# Patient Record
Sex: MALE | Race: WHITE | Employment: UNEMPLOYED | URBAN - METROPOLITAN AREA
[De-identification: names, ages, dates, MRNs, and addresses within clinical notes are randomized per-mention and may not be internally consistent; named-entity substitution may affect disease eponyms.]

---

## 2019-10-28 ENCOUNTER — OFFICE VISIT (OUTPATIENT)
Dept: ORTHOPEDIC SURGERY | Age: 39
End: 2019-10-28
Payer: COMMERCIAL

## 2019-10-28 VITALS
HEIGHT: 78 IN | SYSTOLIC BLOOD PRESSURE: 131 MMHG | BODY MASS INDEX: 34.83 KG/M2 | DIASTOLIC BLOOD PRESSURE: 89 MMHG | HEART RATE: 78 BPM | WEIGHT: 301 LBS

## 2019-10-28 DIAGNOSIS — M77.11 LATERAL EPICONDYLITIS OF RIGHT ELBOW: Primary | ICD-10-CM

## 2019-10-28 PROCEDURE — 99203 OFFICE O/P NEW LOW 30 MIN: CPT | Performed by: ORTHOPAEDIC SURGERY

## 2019-10-28 PROCEDURE — 20605 DRAIN/INJ JOINT/BURSA W/O US: CPT | Performed by: ORTHOPAEDIC SURGERY

## 2019-10-28 SDOH — HEALTH STABILITY: MENTAL HEALTH: HOW OFTEN DO YOU HAVE A DRINK CONTAINING ALCOHOL?: NEVER

## 2019-10-28 ASSESSMENT — ENCOUNTER SYMPTOMS
APNEA: 0
COUGH: 0
CHEST TIGHTNESS: 0
NAUSEA: 0
DIARRHEA: 0
ABDOMINAL DISTENTION: 0
COLOR CHANGE: 0
CONSTIPATION: 0
VOMITING: 0
ABDOMINAL PAIN: 0
SHORTNESS OF BREATH: 0

## 2019-10-29 RX ORDER — METHYLPREDNISOLONE ACETATE 40 MG/ML
40 INJECTION, SUSPENSION INTRA-ARTICULAR; INTRALESIONAL; INTRAMUSCULAR; SOFT TISSUE ONCE
Status: COMPLETED | OUTPATIENT
Start: 2019-10-29 | End: 2019-10-30

## 2019-10-29 RX ORDER — LIDOCAINE HYDROCHLORIDE 10 MG/ML
4 INJECTION, SOLUTION INFILTRATION; PERINEURAL ONCE
Status: COMPLETED | OUTPATIENT
Start: 2019-10-29 | End: 2019-10-30

## 2019-10-30 RX ADMIN — LIDOCAINE HYDROCHLORIDE 4 ML: 10 INJECTION, SOLUTION INFILTRATION; PERINEURAL at 07:49

## 2019-10-30 RX ADMIN — METHYLPREDNISOLONE ACETATE 40 MG: 40 INJECTION, SUSPENSION INTRA-ARTICULAR; INTRALESIONAL; INTRAMUSCULAR; SOFT TISSUE at 07:50

## 2019-11-25 ENCOUNTER — OFFICE VISIT (OUTPATIENT)
Dept: ORTHOPEDIC SURGERY | Age: 39
End: 2019-11-25
Payer: COMMERCIAL

## 2019-11-25 VITALS
DIASTOLIC BLOOD PRESSURE: 88 MMHG | BODY MASS INDEX: 34.71 KG/M2 | WEIGHT: 300 LBS | SYSTOLIC BLOOD PRESSURE: 138 MMHG | HEIGHT: 78 IN | HEART RATE: 87 BPM

## 2019-11-25 DIAGNOSIS — M77.11 LATERAL EPICONDYLITIS OF RIGHT ELBOW: Primary | ICD-10-CM

## 2019-11-25 PROCEDURE — 99212 OFFICE O/P EST SF 10 MIN: CPT | Performed by: ORTHOPAEDIC SURGERY

## 2019-11-25 RX ORDER — DULOXETIN HYDROCHLORIDE 60 MG/1
CAPSULE, DELAYED RELEASE ORAL
Refills: 0 | COMMUNITY
Start: 2019-11-19

## 2019-11-25 ASSESSMENT — ENCOUNTER SYMPTOMS
COUGH: 0
ABDOMINAL PAIN: 0
APNEA: 0
ABDOMINAL DISTENTION: 0
NAUSEA: 0
CONSTIPATION: 0
SHORTNESS OF BREATH: 0
CHEST TIGHTNESS: 0
VOMITING: 0
DIARRHEA: 0
COLOR CHANGE: 0

## 2020-03-09 ENCOUNTER — OFFICE VISIT (OUTPATIENT)
Dept: ORTHOPEDIC SURGERY | Age: 40
End: 2020-03-09
Payer: COMMERCIAL

## 2020-03-09 VITALS
DIASTOLIC BLOOD PRESSURE: 84 MMHG | HEIGHT: 78 IN | BODY MASS INDEX: 34.71 KG/M2 | SYSTOLIC BLOOD PRESSURE: 125 MMHG | WEIGHT: 300 LBS | HEART RATE: 87 BPM

## 2020-03-09 PROCEDURE — 99212 OFFICE O/P EST SF 10 MIN: CPT | Performed by: ORTHOPAEDIC SURGERY

## 2020-03-09 PROCEDURE — 20605 DRAIN/INJ JOINT/BURSA W/O US: CPT | Performed by: ORTHOPAEDIC SURGERY

## 2020-03-09 RX ADMIN — METHYLPREDNISOLONE ACETATE 40 MG: 40 INJECTION, SUSPENSION INTRA-ARTICULAR; INTRALESIONAL; INTRAMUSCULAR; SOFT TISSUE at 11:20

## 2020-03-09 RX ADMIN — LIDOCAINE HYDROCHLORIDE 4 ML: 10 INJECTION, SOLUTION INFILTRATION; PERINEURAL at 11:20

## 2020-03-09 ASSESSMENT — ENCOUNTER SYMPTOMS
DIARRHEA: 0
ABDOMINAL PAIN: 0
NAUSEA: 0
APNEA: 0
VOMITING: 0
COLOR CHANGE: 0
ABDOMINAL DISTENTION: 0
COUGH: 0
CONSTIPATION: 0
CHEST TIGHTNESS: 0
SHORTNESS OF BREATH: 0

## 2020-03-09 NOTE — PROGRESS NOTES
[facility]  [address] [place] [id number]  Dept: [phone]  Dept Fax: [phone]        Right Elbow Follow Up      Subjective:     Chief Complaint   Patient presents with    Elbow Pain     Patient is here today to follow-up on his lateral epicondylitis of the right elbow, and would like an injection. HPI:     Kel Payton is a 42y.o. year old right hand dominant male that has had pain in the right elbow for 4.5 months since August. 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 As far as any trauma to the elbow, the patient indicates there was no trauma. The pain is not worse at night but it is worse when doing activities that involve lifting with the elbow. The pain restricts activities such as: heavy lifting and typing without pain. The pain does not seem to improve with time. The following medications have been tried: Motrin. He has also tried heat, rest and ice. The patient has had a corticosteroid injection on 10/28/2019 into the elbow with 100% pain relief for 4.5 months. The patient has not tried physical therapy. The patient has not has surgery. The opposite elbow is okay. The patient states that the cortisone injection took all of his pain away, but in the last 3-4 weeks the pain has returned to how it felt before the injection. ROS:   Review of Systems   Constitutional: Positive for activity change. Negative for appetite change, fatigue and fever. Respiratory: Negative for apnea, cough, chest tightness and shortness of breath. Cardiovascular: Negative for chest pain, palpitations and leg swelling. Gastrointestinal: Negative for abdominal distention, abdominal pain, constipation, diarrhea, nausea and vomiting. Genitourinary: Negative for difficulty urinating, dysuria and hematuria. Musculoskeletal: Positive for arthralgias. Negative for gait problem, joint swelling and myalgias.    Skin: Negative for color experience a increase in soreness the first 24-48 hours due to a cortisone flair and can take anti-inflammatories for a short period of time to reduce that soreness. The patient should not submerge the injection site in water for a minimum of 24 hours to avoid infection. This means no lakes, pools, ponds, or hot tubs for 24 hours. If the patient is diabetic the injection may increase their blood sugar for up to one week. The patient can do this cortisone injection once every 3 months as needed. If the injections stop working and do not give the patient relief the patient should consider surgical interventions to produce long term relief. A physical therapy prescription was not given. Patient should return to the clinic PRN to follow up with Phil Salinas D.O. . The patient will call the office immediately with any problems. Orders Placed This Encounter   Medications    lidocaine 1 % injection 4 mL    methylPREDNISolone acetate (DEPO-MEDROL) injection 40 mg       Orders Placed This Encounter   Procedures    XR ELBOW RIGHT (MIN 3 VIEWS)     Standing Status:   Future     Number of Occurrences:   1     Standing Expiration Date:   3/9/2021     Order Specific Question:   Reason for exam:     Answer:   pain       I, Shaheen Alvarez MS, AT, ATC, am scribing for and in the presence of Phil Salinas D.O.. 3/10/2020  3:34 PM            Electronically signed by Jai Farias DO, on 3/10/2020 at 3:34 PM             I, Phil Salinas DO, have personally seen this patient and I have reviewed the CC, PMH, FHX and Social History as provided by other clinical staff. I reassessed the HPI and ROS as scribed by Shaheen Alvarez ATC in my presence and it is both accurate and complete. Thereafter, I personally performed the PE, reviewed the imaging and established the DX and POC. I agree with the documentation provided by the .  I have reviewed all documentation in its entirety prior to providing my signature indicating

## 2020-03-10 RX ORDER — LIDOCAINE HYDROCHLORIDE 10 MG/ML
4 INJECTION, SOLUTION INFILTRATION; PERINEURAL ONCE
Status: COMPLETED | OUTPATIENT
Start: 2020-03-10 | End: 2020-03-09

## 2020-03-10 RX ORDER — METHYLPREDNISOLONE ACETATE 40 MG/ML
40 INJECTION, SUSPENSION INTRA-ARTICULAR; INTRALESIONAL; INTRAMUSCULAR; SOFT TISSUE ONCE
Status: COMPLETED | OUTPATIENT
Start: 2020-03-10 | End: 2020-03-09

## 2020-11-16 ENCOUNTER — OFFICE VISIT (OUTPATIENT)
Dept: ORTHOPEDIC SURGERY | Age: 40
End: 2020-11-16
Payer: COMMERCIAL

## 2020-11-16 VITALS
SYSTOLIC BLOOD PRESSURE: 138 MMHG | HEART RATE: 88 BPM | WEIGHT: 255 LBS | BODY MASS INDEX: 29.5 KG/M2 | TEMPERATURE: 97 F | HEIGHT: 78 IN | DIASTOLIC BLOOD PRESSURE: 79 MMHG

## 2020-11-16 PROCEDURE — 20551 NJX 1 TENDON ORIGIN/INSJ: CPT | Performed by: ORTHOPAEDIC SURGERY

## 2020-11-16 PROCEDURE — 99213 OFFICE O/P EST LOW 20 MIN: CPT | Performed by: ORTHOPAEDIC SURGERY

## 2020-11-16 RX ORDER — METHYLPREDNISOLONE ACETATE 40 MG/ML
40 INJECTION, SUSPENSION INTRA-ARTICULAR; INTRALESIONAL; INTRAMUSCULAR; SOFT TISSUE ONCE
Status: COMPLETED | OUTPATIENT
Start: 2020-11-16 | End: 2020-11-16

## 2020-11-16 RX ORDER — CELECOXIB 50 MG/1
50 CAPSULE ORAL 2 TIMES DAILY
COMMUNITY
Start: 2020-10-13

## 2020-11-16 RX ORDER — LIDOCAINE HYDROCHLORIDE 10 MG/ML
4 INJECTION, SOLUTION INFILTRATION; PERINEURAL ONCE
Status: COMPLETED | OUTPATIENT
Start: 2020-11-16 | End: 2020-11-16

## 2020-11-16 RX ORDER — GABAPENTIN 100 MG/1
100 CAPSULE ORAL 3 TIMES DAILY
COMMUNITY
Start: 2020-11-05

## 2020-11-16 RX ADMIN — METHYLPREDNISOLONE ACETATE 40 MG: 40 INJECTION, SUSPENSION INTRA-ARTICULAR; INTRALESIONAL; INTRAMUSCULAR; SOFT TISSUE at 14:17

## 2020-11-16 RX ADMIN — LIDOCAINE HYDROCHLORIDE 4 ML: 10 INJECTION, SOLUTION INFILTRATION; PERINEURAL at 14:16

## 2020-11-16 ASSESSMENT — ENCOUNTER SYMPTOMS
CHEST TIGHTNESS: 0
ABDOMINAL DISTENTION: 0
ABDOMINAL PAIN: 0
APNEA: 0
CONSTIPATION: 0
VOMITING: 0
DIARRHEA: 0
COUGH: 0
SHORTNESS OF BREATH: 0
COLOR CHANGE: 0
NAUSEA: 0

## 2020-11-16 NOTE — PATIENT INSTRUCTIONS
CORTISONE INJECTION CARE    The injection site should never get red, hot, or swollen and if it does the patient will contact our office right away. The patient may experience a increase in soreness the first 24-48 hours due to a cortisone flair and can take anti-inflammatories for a short period of time to reduce that soreness. The patient should not submerge the injection site in water for a minimum of 24 hours to avoid infection. This means no lakes, pools, ponds, or hot tubs for 24 hours. If the patient is diabetic the injection may increase their blood sugar for up to one week. The patient can do this cortisone injection once every 3 months as needed. CORTISONE INJECTION CARE    The injection site should never get red, hot, or swollen and if it does the patient will contact our office right away. The patient may experience a increase in soreness the first 24-48 hours due to a cortisone flair and can take anti-inflammatories for a short period of time to reduce that soreness. The patient should not submerge the injection site in water for a minimum of 24 hours to avoid infection. This means no lakes, pools, ponds, or hot tubs for 24 hours. If the patient is diabetic the injection may increase their blood sugar for up to one week. The patient can do this cortisone injection once every 3 months as needed.

## 2020-11-16 NOTE — PROGRESS NOTES
[facility]  [address]  [facility] [address]  Dept: [phone]  Dept Fax: [phone]        Right Elbow Follow Up      Subjective:     Chief Complaint   Patient presents with    Elbow Pain     Right elbow lateral epicondylitis, solomon- 3/9/20     HPI:     Dorian Akbar is a 42y.o. year old right hand dominant male that has had pain in the right elbow for 4.5 months since August. 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 The pain is not worse at night but it is worse when doing activities that involve lifting with the elbow. The pain restricts activities such as: heavy lifting and typing without pain. The pain does not seem to improve with time. The following medications have been tried: Motrin and Celebrex 50 mg twice a day. He has also tried heat, rest, and ice. The patient has had a corticosteroid injection on 10/28/2019 into the elbow with 100% pain relief for 4.5 months. The patient had another cortisone injection on 03/09/2020 with 100% pain relief for about 7 months. The patient has not tried physical therapy. The patient has not has surgery. The opposite elbow is okay. The patient states the pain came back about 3 weeks ago but it is not as bad as it was back in March. He also states the flair was not as bad as the cortisone injection back in October of 2019. ROS:   Review of Systems   Constitutional: Positive for activity change. Negative for appetite change, fatigue and fever. Respiratory: Negative for apnea, cough, chest tightness and shortness of breath. Cardiovascular: Negative for chest pain, palpitations and leg swelling. Gastrointestinal: Negative for abdominal distention, abdominal pain, constipation, diarrhea, nausea and vomiting. Genitourinary: Negative for difficulty urinating, dysuria and hematuria.    Musculoskeletal: Positive for arthralgias. Negative for gait problem, joint swelling and myalgias. Skin: Negative for color change and rash. Neurological: Negative for dizziness, weakness, numbness and headaches. Psychiatric/Behavioral: Negative for sleep disturbance. Past Medical History:    Past Medical History:   Diagnosis Date    Anxiety     Arthritis     knees elbows    Depression     GERD (gastroesophageal reflux disease)     Hand, foot and mouth disease        Past Surgical History:    Past Surgical History:   Procedure Laterality Date    APPENDECTOMY      CARPAL TUNNEL RELEASE Right 1/21/15    COLONOSCOPY      normal    EYE SURGERY Bilateral     lasix    KNEE SURGERY Right     scope    SHOULDER SURGERY Bilateral     right x 3/ left x 1       CurrentMedications:   Current Outpatient Medications   Medication Sig Dispense Refill    celecoxib (CELEBREX) 50 MG capsule Take 50 mg by mouth 2 times daily      gabapentin (NEURONTIN) 100 MG capsule Take 100 mg by mouth 3 times daily.  DULoxetine (CYMBALTA) 60 MG extended release capsule   0    albuterol sulfate HFA (VENTOLIN HFA) 108 (90 BASE) MCG/ACT inhaler Inhale 2 puffs into the lungs every 6 hours as needed for Wheezing 1 Inhaler 3    docusate sodium (COLACE) 100 MG capsule Take 1 capsule by mouth 2 times daily as needed for Constipation. 10 capsule 0    NONFORMULARY Take 1 tablet by mouth daily. Acid reflux      ARIPiprazole (ABILIFY) 5 MG tablet Take 5 mg by mouth daily. No current facility-administered medications for this visit.         Allergies:    Vicodin [hydrocodone-acetaminophen]    Social History:   Social History     Socioeconomic History    Marital status:      Spouse name: None    Number of children: None    Years of education: None    Highest education level: None   Occupational History    None   Social Needs    Financial resource strain: None    Food insecurity     Worry: None     Inability: None    informed the patient that when the injections stop working then there is a surgical intervention we can try that involves cutting his tendon to alleviate his pain. I explained to him that he cannot do any damage even with the injections because if he had a torn tendon now the injection would still provide him good pain relief. I told him to continue his Celebrex to decrease inflammation and since he is on such a low dose of Celebrex then he can take four pills twice a day. The patient has opted for a cortisone injection into the right elbow to help reduce inflammation and pain. The injection site should never get red, hot, or swollen and if it does the patient will contact our office right away. The patient may experience a increase in soreness the first 24-48 hours due to a cortisone flair and can take anti-inflammatories for a short period of time to reduce that soreness. The patient should not submerge the injection site in water for a minimum of 24 hours to avoid infection. This means no lakes, pools, ponds, or hot tubs for 24 hours. If the patient is diabetic the injection may increase their blood sugar for up to one week. The patient can do this cortisone injection once every 3 months as needed. If the injections stop working and do not give the patient relief the patient should consider surgical interventions to produce long term relief. A physical therapy prescription was not given. Patient should return to the clinic PRN to follow up with Noemi Robertson D.O. . The patient will call the office immediately with any problems. No orders of the defined types were placed in this encounter. No orders of the defined types were placed in this encounter. Spenser Yepez MS, AT, ATC, am scribing for and in the presence of Noemi FROST. O.. 11/16/2020  1:26 PM            Electronically signed by Miguel Olmstead ATC, on 11/16/2020 at 1:26 PM             I, Noemi Robertson DO, have personally seen this

## 2021-02-22 ENCOUNTER — OFFICE VISIT (OUTPATIENT)
Dept: ORTHOPEDIC SURGERY | Age: 41
End: 2021-02-22
Payer: COMMERCIAL

## 2021-02-22 VITALS
WEIGHT: 255 LBS | DIASTOLIC BLOOD PRESSURE: 81 MMHG | BODY MASS INDEX: 29.5 KG/M2 | SYSTOLIC BLOOD PRESSURE: 127 MMHG | HEIGHT: 78 IN | HEART RATE: 83 BPM

## 2021-02-22 DIAGNOSIS — M77.11 LATERAL EPICONDYLITIS OF RIGHT ELBOW: Primary | ICD-10-CM

## 2021-02-22 PROCEDURE — 20551 NJX 1 TENDON ORIGIN/INSJ: CPT | Performed by: PHYSICIAN ASSISTANT

## 2021-02-22 PROCEDURE — 99212 OFFICE O/P EST SF 10 MIN: CPT | Performed by: PHYSICIAN ASSISTANT

## 2021-02-22 RX ORDER — METHYLPREDNISOLONE ACETATE 40 MG/ML
40 INJECTION, SUSPENSION INTRA-ARTICULAR; INTRALESIONAL; INTRAMUSCULAR; SOFT TISSUE ONCE
Status: COMPLETED | OUTPATIENT
Start: 2021-02-22 | End: 2021-02-22

## 2021-02-22 RX ORDER — LIDOCAINE HYDROCHLORIDE 10 MG/ML
4 INJECTION, SOLUTION INFILTRATION; PERINEURAL ONCE
Status: COMPLETED | OUTPATIENT
Start: 2021-02-22 | End: 2021-02-22

## 2021-02-22 RX ADMIN — METHYLPREDNISOLONE ACETATE 40 MG: 40 INJECTION, SUSPENSION INTRA-ARTICULAR; INTRALESIONAL; INTRAMUSCULAR; SOFT TISSUE at 09:39

## 2021-02-22 RX ADMIN — LIDOCAINE HYDROCHLORIDE 4 ML: 10 INJECTION, SOLUTION INFILTRATION; PERINEURAL at 09:38

## 2021-02-22 ASSESSMENT — ENCOUNTER SYMPTOMS
CONSTIPATION: 0
ABDOMINAL DISTENTION: 0
COUGH: 0
APNEA: 0
CHEST TIGHTNESS: 0
VOMITING: 0
ABDOMINAL PAIN: 0
RESPIRATORY NEGATIVE: 1
COLOR CHANGE: 0
DIARRHEA: 0
NAUSEA: 0
SHORTNESS OF BREATH: 0

## 2021-02-22 NOTE — PROGRESS NOTES
[facility]  [address]  [facility] [address]  Dept: [phone]  Dept Fax: [phone]        right Elbow Follow Up      Subjective:     Chief Complaint   Patient presents with    Elbow Pain     Right elbow pain, cortisone- 11/16/20     HPI:     José Miguel Akbar is a 42y.o. year old right hand dominant male that has had pain in the right elbow over the couple few of weeks. 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 The pain is not worse at night but it is worse when doing activities that involve lifting with the elbow. The pain restricts activities such as: heavy lifting and typing without pain. The pain does not seem to improve with time. The following medications have been tried: Celebrex 50 mg twice a day. He has also tried heat, rest, and ice. The patient has had a corticosteroid injection on 11/16/2020 into the elbow with 100% pain relief for 2.5 months. The patient had another cortisone injection on 03/09/2020 with 100% pain relief for about 7 months. The patient has not tried physical therapy. The patient has not has surgery. The opposite elbow is okay. ROS:   Review of Systems   Constitutional: Positive for activity change. Negative for appetite change, fatigue and fever. Respiratory: Negative. Negative for apnea, cough, chest tightness and shortness of breath. Cardiovascular: Negative. Negative for chest pain, palpitations and leg swelling. Gastrointestinal: Negative for abdominal distention, abdominal pain, constipation, diarrhea, nausea and vomiting. Genitourinary: Negative for difficulty urinating, dysuria and hematuria. Musculoskeletal: Positive for arthralgias. Negative for gait problem, joint swelling and myalgias. Skin: Negative for color change and rash. Neurological: Negative for dizziness, weakness, numbness and headaches.  Highest education level: Not on file   Occupational History    Not on file   Social Needs    Financial resource strain: Not on file    Food insecurity     Worry: Not on file     Inability: Not on file    Transportation needs     Medical: Not on file     Non-medical: Not on file   Tobacco Use    Smoking status: Never Smoker    Smokeless tobacco: Current User     Types: Chew   Substance and Sexual Activity    Alcohol use: Never     Alcohol/week: 0.0 standard drinks     Frequency: Never     Comment: quit 11/2014, states occassional beer    Drug use: Never    Sexual activity: Not on file   Lifestyle    Physical activity     Days per week: Not on file     Minutes per session: Not on file    Stress: Not on file   Relationships    Social connections     Talks on phone: Not on file     Gets together: Not on file     Attends [employer] service: Not on file     Active member of club or organization: Not on file     Attends meetings of clubs or organizations: Not on file     Relationship status: Not on file    Intimate partner violence     Fear of current or ex partner: Not on file     Emotionally abused: Not on file     Physically abused: Not on file     Forced sexual activity: Not on file   Other Topics Concern    Not on file   Social History Narrative    Not on file       Family History:  No family history on file. I have reviewed the CC, HPI, ROS, PMH, FHX, Social History, and if not present in this note, I have reviewed in the patient's chart. I agree with the documentation provided by other staff and have reviewed their documentation prior to providing my signature indicating agreement. Vitals:   /81   Pulse 83   Ht 6' 6\" (1.981 m)   Wt 255 lb (115.7 kg)   BMI 29.47 kg/m²  Body mass index is 29.47 kg/m². Physical Examination:     Orthopedics:    GENERAL: Alert and oriented X3 in no acute distress. SKIN: Intact without lesions or ulcerations. NEURO: Musculoskeletal and axillary nerves intact to sensory and motor testing. VASC: Capillary refill is less than 3 seconds. ELBOW Exam    LOCATION: right Elbow  MUSC: Good strength is available in these motions. TESTS: Ligamentous exam is stable to varus and valgus stress testing at 0 and 30 degrees. negative Tinel's sign at elbow. + pain on resisted wrist extension. ROM: 0 degrees of Flexion, 150 degrees of Extension, 90 degrees of Pronation, 90 degrees of Supination  INSPECTION: The patient is tender to palpation over the lateral epicondyle. There is no effusion. No obvious deformity of the elbow. No lucio pain or instability to palpation. Assessment:     1. Lateral epicondylitis of right elbow      Procedures:    Procedure: yes    Azucena Mae agreed for the cortisone injection into the right elbow. The patient was placed in the supine position on the exam table. The lateral epicondyle of the elbow and the point of maximum tenderness was identified and marked. The skin was prepped with betadine in a sterile fashion. Utilizing sterile technique,  4cc of 1% Lidocaine and 1cc of 40 mg of Depo medrol was injected. There was no resistance to injection. The wound was cleansed and a Band-Aid was placed. The patient tolerated the procedure without difficulty. Adverse reactions of the injection was discussed with the patient including signs of infection (increasing pain, redness, swelling) and the patient was instructed to call immediately with any of these symptoms. Radiology:   No results found.    Plan: Treatment : I reviewed the X-ray. We discussed the etiologies and natural histories of lateral epicondylitis of the right elbow. We discussed the various treatment alternatives including anti-inflammatory medications, physical therapy, injections, further imaging studies and as a last result surgery. During today's visit, we discussed that eventually the cortisone may stop working and we would have to move to a surgical approach. The patient states he is not ready to have any surgeries at this time. He is working with pain management and getting cortisone injections into his lumbar spine. The cortisone shots worked really well for him for several months and he would like to continue that. The patient has opted for a cortisone injection into the lateral epicondylitis to help reduce inflammation and pain. The injection site should never get red, hot, or swollen and if it does the patient will contact our office right away. The patient may experience a increase in soreness the first 24-48 hours due to a cortisone flair and can take anti-inflammatories for a short period of time to reduce that soreness. The patient should not submerge the injection site in water for a minimum of 24 hours to avoid infection. This means no lakes, pools, ponds, or hot tubs for 24 hours. If the patient is diabetic the injection may increase their blood sugar for up to one week. The patient can do this cortisone injection once every 3 months as needed. If the injections stop working and do not give the patient relief the patient should consider surgical interventions to produce long term relief. A physical therapy prescription was not given but I did include tennis elbow stretches and strengthening exercises to his AVS. Patient should return to the clinic as needed to follow up with Huong Walton D.O. . The patient will call the office immediately with any problems.       Orders Placed This Encounter   Medications  lidocaine 1 % injection 4 mL    methylPREDNISolone acetate (DEPO-MEDROL) injection 40 mg       No orders of the defined types were placed in this encounter. Shanell Coreas PA-C, have personally seen this patient, reviewed the chart including history, and imaging if done. I personally  performed the physical exam and obtained any needed additional history. I placed orders, performed or supervised procedures and developed the treatment plan.     Electronically signed by Peg Gunn PA-C, on 2/22/2021 at 9:28 AM        Electronically signed by Peg Gunn PA-C, on 2/22/2021 at 9:28 AM